# Patient Record
Sex: FEMALE
[De-identification: names, ages, dates, MRNs, and addresses within clinical notes are randomized per-mention and may not be internally consistent; named-entity substitution may affect disease eponyms.]

---

## 2018-11-21 ENCOUNTER — HOSPITAL ENCOUNTER (EMERGENCY)
Dept: HOSPITAL 97 - ER | Age: 73
Discharge: HOME | End: 2018-11-21
Payer: COMMERCIAL

## 2018-11-21 DIAGNOSIS — M54.12: Primary | ICD-10-CM

## 2018-11-21 DIAGNOSIS — Z85.3: ICD-10-CM

## 2018-11-21 PROCEDURE — 70450 CT HEAD/BRAIN W/O DYE: CPT

## 2018-11-21 PROCEDURE — 99284 EMERGENCY DEPT VISIT MOD MDM: CPT

## 2018-11-21 PROCEDURE — 72125 CT NECK SPINE W/O DYE: CPT

## 2018-11-21 NOTE — ER
Nurse's Notes                                                                                     

 [facility]                                                                

Name: Ania Siddiqui                                                                             

Age: 73 yrs                                                                                       

Sex: Female                                                                                       

: 1945                                                                                   

MRN: [id number]                                                                                   

Arrival Date: 2018                                                                          

Time: 14:05                                                                                       

Account#: [id number]                                                                            

Bed 12                                                                                            

Private MD: None, None                                                                            

Diagnosis: Radiculopathy, cervical region                                                         

                                                                                                  

Presentation:                                                                                     

                                                                                             

14:08 Presenting complaint: Patient states: "The back of my neck has been hurting for 3 days. aj1 

      I haven't been able to sleep" Patient denies injury to her neck. Reports that pain          

      radiates into both of her shoulders. Transition of care: patient was not received from      

      another setting of care. Onset of symptoms was 2018. Risk Assessment: Do       

      you want to hurt yourself or someone else? Patient reports no desire to harm self or        

      others. Initial Sepsis Screen: Does the patient meet any 2 criteria? No. Patient's          

      initial sepsis screen is negative. Does the patient have a suspected source of              

      infection? No. Patient's initial sepsis screen is negative. Care prior to arrival: None.    

14:08 Method Of Arrival: Ambulatory                                                           aj1 

14:08 Acuity: KIMI 4                                                                           aj1 

                                                                                                  

Triage Assessment:                                                                                

14:10 General: Appears in no apparent distress. uncomfortable, Behavior is calm, cooperative, aj1 

      appropriate for age. Pain: Complains of pain in neck Pain radiates to left trapezius,       

      right trapezius and neck Pain currently is 8 out of 10 on a pain scale. Quality of pain     

      is described as sharp, Pain began 2-3 days ago. Is continuous, Aggravated by                

      repositioning. Neuro: Level of Consciousness is awake, alert, obeys commands.               

      Cardiovascular: Patient's skin is warm and dry. Respiratory: Airway is patent               

      Respiratory effort is even, unlabored, Respiratory pattern is regular, symmetrical.         

                                                                                                  

Historical:                                                                                       

- Allergies:                                                                                      

14:10 No Known Allergies;                                                                     aj1 

- Home Meds:                                                                                      

14:10 tamoxifen oral oral [Active]; Folic Acid Oral [Active]; Methotrexate Sodium Oral        aj1 

      [Active];                                                                                   

- PMHx:                                                                                           

14:10 breast cancer- in remission; Rheumatoid Arthritis;                                      aj1 

                                                                                                  

- Immunization history:: Flu vaccine is up to date.                                               

- Social history:: Smoking status: Patient/guardian denies using tobacco.                         

- Ebola Screening: : Patient denies travel to an Ebola-affected area in the 21 days               

  before illness onset.                                                                           

                                                                                                  

                                                                                                  

Screening:                                                                                        

15:20 Abuse screen: Denies threats or abuse. Denies injuries from another. Nutritional        ss  

      screening: No deficits noted. Tuberculosis screening: No symptoms or risk factors           

      identified. Never had TB. Fall Risk None identified.                                        

                                                                                                  

Assessment:                                                                                       

15:20 General: Appears in no apparent distress. comfortable, Behavior is calm, cooperative,   ss  

      Denies fever, feeling ill, fatigue, chills. Pain: Complains of pain in right trapezius      

      and left trapezius and neck Pain currently is 8 out of 10 on a pain scale. Quality of       

      pain is described as aching, tender. Neuro: Level of Consciousness is awake, alert,         

      obeys commands, Oriented to person, place, time, situation. Cardiovascular: Capillary       

      refill < 3 seconds is brisk in bilateral fingers. Respiratory: Airway is patent             

      Respiratory effort is even, unlabored, Respiratory pattern is regular, symmetrical.         

      EENT: Oral mucosa is moist. Throat is clear. Derm: Skin is intact, is healthy with good     

      turgor, Skin is pink, warm \T\ dry. normal. Musculoskeletal: Circulation, motion, and       

      sensation intact. Range of motion: intact in all extremities, Swelling absent.              

                                                                                                  

Vital Signs:                                                                                      

14:10  / 83; Pulse 75; Resp 18; Temp 97.9; Pulse Ox 98% on R/A; Weight 78.47 kg (R);    aj1 

      Height 5 ft. 0 in. (152.40 cm) (R); Pain 8/10;                                              

14:10 Body Mass Index 33.79 (78.47 kg, 152.40 cm)                                             aj1 

                                                                                                  

ED Course:                                                                                        

14:05 Patient arrived in ED.                                                                  mr  

14:07 None, None is Private Physician.                                                        mr  

14:09 Triage completed.                                                                       aj1 

14:10 Arm band placed on Patient placed in an exam room.                                      aj1 

15:02 Katelynn Darling FNP-C is PHCP.                                                        kb  

15:02 Bobby Durbin MD is Attending Physician.                                             kb  

15:20 Rita Luna RN is Primary Nurse.                                                    ss  

15:20 Patient has correct armband on for positive identification. Bed in low position. Call   ss  

      light in reach.                                                                             

15:22 CT Head C Spine In Process Unspecified.                                                 EDMS

15:23 CT completed. Patient tolerated procedure well. Patient moved to CT via wheelchair.     vr  

      Patient moved back from CT.                                                                 

16:02 No provider procedures requiring assistance completed. Patient did not have IV access   ss  

      during this emergency room visit.                                                           

                                                                                                  

Administered Medications:                                                                         

No medications were administered                                                                  

                                                                                                  

                                                                                                  

Outcome:                                                                                          

15:56 Discharge ordered by MD.                                                                gunjan  

16:02 Discharged to home ambulatory, with family.                                             ss  

16:02 Condition: good                                                                             

16:02 Discharge instructions given to patient, family, Instructed on discharge instructions,      

      follow up and referral plans. medication usage, Demonstrated understanding of               

      instructions, follow-up care, medications, Prescriptions given X 2.                         

16:03 Patient left the ED.                                                                    ss  

                                                                                                  

Signatures:                                                                                       

Dispatcher [employer]                           EDMS                                                 

Katelynn Darling, ANAYA SETHP-Erendira Faria, RN                     RN   aj1                                                  

Kristy Robbins                                 mr                                                   

Rita Luna, RN                      RN   Shireen Ruiz                                                   

                                                                                                  

**************************************************************************************************

## 2018-11-21 NOTE — EDPHYS
Physician Documentation                                                                           

 [facility]                                                                

Name: Ania Siddiqui                                                                             

Age: 73 yrs                                                                                       

Sex: Female                                                                                       

: 1945                                                                                   

MRN: [id number]                                                                                   

Arrival Date: 2018                                                                          

Time: 14:05                                                                                       

Account#: [id number]                                                                            

Bed 12                                                                                            

Private MD: None, None                                                                            

ED Physician Bobby Durbin                                                                      

HPI:                                                                                              

                                                                                             

15:22 This 73 yrs old  Female presents to ER via Ambulatory with complaints of Neck   kb  

      Problem, Shoulder Pain, Back Pain.                                                          

15:22 The patient or guardian complains of pain, that is acute, tenderness. The symptoms are  kb  

      located diffusely. Onset: The symptoms/episode began/occurred 4 day(s) ago. Context:        

      The problem was sustained at home, The neck injury/problem resulted from from unknown       

      cause. Associated signs and symptoms: Pertinent positives: This patient does not have       

      any pertinent positive signs or symptoms associated with neck pain. The patient denies      

      any alcohol use. The patient is not apparently intoxicated. No neurological symptoms        

      were experienced by the patient prior to arrival in the emergency department. The pain      

      radiates to the left trapezius and right trapezius. Modifying factors: The symptoms are     

      alleviated by nothing. the symptoms are aggravated by pressure. Severity of symptoms:       

      At their worst the symptoms were moderate, in the emergency department the symptoms are     

      unchanged. The patient has not experienced similar symptoms in the past. The patient        

      has not recently seen a physician.                                                          

                                                                                                  

Historical:                                                                                       

- Allergies:                                                                                      

14:10 No Known Allergies;                                                                     aj1 

- Home Meds:                                                                                      

14:10 tamoxifen oral oral [Active]; Folic Acid Oral [Active]; Methotrexate Sodium Oral        aj1 

      [Active];                                                                                   

- PMHx:                                                                                           

14:10 breast cancer- in remission; Rheumatoid Arthritis;                                      aj1 

                                                                                                  

- Immunization history:: Flu vaccine is up to date.                                               

- Social history:: Smoking status: Patient/guardian denies using tobacco.                         

- Ebola Screening: : Patient denies travel to an Ebola-affected area in the 21 days               

  before illness onset.                                                                           

                                                                                                  

                                                                                                  

ROS:                                                                                              

15:21 Constitutional: Negative for fever, chills, and weight loss, Cardiovascular: Negative   kb  

      for chest pain, palpitations, and edema, Respiratory: Negative for shortness of breath,     

      cough, wheezing, and pleuritic chest pain, Abdomen/GI: Negative for abdominal pain,         

      nausea, vomiting, diarrhea, and constipation, MS/Extremity: Negative for injury and         

      deformity, Skin: Negative for injury, rash, and discoloration, Neuro: Negative for          

      headache, weakness, numbness, tingling, and seizure.                                        

15:21 Neck: Positive for pain with movement, pain at rest.                                        

15:21 Back: Positive for pain at rest, pain with movement, of the left trapezius and right        

      trapezius.                                                                                  

                                                                                                  

Exam:                                                                                             

15:21 Constitutional:  This is a well developed, well nourished patient who is awake, alert,  kb  

      and in no acute distress. Head/Face:  Normocephalic, atraumatic. Neck:  Trachea             

      midline, no thyromegaly or masses palpated, and no cervical lymphadenopathy.  Supple,       

      full range of motion without nuchal rigidity, or vertebral point tenderness.  No            

      Meningismus. Chest/axilla:  Normal chest wall appearance and motion.  Nontender with no     

      deformity.  No lesions are appreciated. Cardiovascular:  Regular rate and rhythm with a     

      normal S1 and S2.  No gallops, murmurs, or rubs.  Normal PMI, no JVD.  No pulse             

      deficits. Respiratory:  Lungs have equal breath sounds bilaterally, clear to                

      auscultation and percussion.  No rales, rhonchi or wheezes noted.  No increased work of     

      breathing, no retractions or nasal flaring. Abdomen/GI:  Soft, non-tender, with normal      

      bowel sounds.  No distension or tympany.  No guarding or rebound.  No evidence of           

      tenderness throughout. Skin:  Warm, dry with normal turgor.  Normal color with no           

      rashes, no lesions, and no evidence of cellulitis. MS/ Extremity:  Pulses equal, no         

      cyanosis.  Neurovascular intact.  Full, normal range of motion. Neuro:  Awake and           

      alert, GCS 15, oriented to person, place, time, and situation.  Cranial nerves II-XII       

      grossly intact.  Motor strength 5/5 in all extremities.  Sensory grossly intact.            

      Cerebellar exam normal.  Normal gait.                                                       

15:21 Back: pain, that is mild,       of the  left trapezius and right trapezius, ROM is          

      normal, normal spinal alignment noted.                                                      

                                                                                                  

Vital Signs:                                                                                      

14:10  / 83; Pulse 75; Resp 18; Temp 97.9; Pulse Ox 98% on R/A; Weight 78.47 kg (R);    aj1 

      Height 5 ft. 0 in. (152.40 cm) (R); Pain 8/10;                                              

14:10 Body Mass Index 33.79 (78.47 kg, 152.40 cm)                                             aj1 

                                                                                                  

MDM:                                                                                              

15:02 Patient medically screened.                                                             kb  

15:21 Data reviewed: vital signs, nurses notes. Data interpreted: Pulse oximetry: on room air kb  

      is 98 %. Interpretation: normal.                                                            

15:55 Counseling: I had a detailed discussion with the patient and/or guardian regarding: the kb  

      historical points, exam findings, and any diagnostic results supporting the                 

      discharge/admit diagnosis, radiology results, the need for outpatient follow up, a          

      family practitioner, to return to the emergency department if symptoms worsen or            

      persist or if there are any questions or concerns that arise at home.                       

                                                                                                  

                                                                                             

15:11 Order name: CT Head C Spine; Complete Time: 15:41                                       kb  

                                                                                                  

Administered Medications:                                                                         

No medications were administered                                                                  

                                                                                                  

                                                                                                  

Disposition:                                                                                      

18 15:56 Discharged to Home. Impression: Radiculopathy, cervical region.                    

- Condition is Stable.                                                                            

- Discharge Instructions: Cervical Radiculopathy, Easy-to-Read.                                   

- Prescriptions for Cyclobenzaprine 10 mg Oral Tablet - take 1 tablet by ORAL route               

  every 8 hours As needed; 21 tablet. Diclofenac Sodium 75 mg Oral Tablet, Delayed                

  Release (E.C.) - take 1 tablet by ORAL route 2 times per day As needed; 30 tablet.              

- Medication Reconciliation Form, Thank You Letter, Antibiotic Education, Prescription            

  Opioid Use form.                                                                                

- Follow up: Emergency Department; When: As needed; Reason: Worsening of condition.               

  Follow up: Private Physician; When: 2 - 3 days; Reason: Recheck today's complaints,             

  Continuance of care, Re-evaluation by your physician.                                           

                                                                                                  

                                                                                                  

                                                                                                  

Addendum:                                                                                         

2018                                                                                        

     06:22 Co-signature as Attending Physician, Bobby Durbin MD I agree with the assessment and  c
ha

           plan of care.                                                                          

                                                                                                  

Signatures:                                                                                       

Dispatcher [employer]                           Katelynn Bailey, FNP-C                 FNP-Ckb                                                   

Erendira Arrieta, RN                     RN   aj1                                                  

Bobby Durbin MD MD cha Smirch, Shelby, RN                      RN   ss                                                   

                                                                                                  

Corrections: (The following items were deleted from the chart)                                    

                                                                                             

16:03 15:56 2018 15:56 Discharged to Home. Impression: Radiculopathy, cervical region.  ss  

      Condition is Stable. Forms are Medication Reconciliation Form, Thank You Letter,            

      Antibiotic Education, Prescription Opioid Use. Follow up: Emergency Department; When:       

      As needed; Reason: Worsening of condition. Follow up: Private Physician; When: 2 - 3        

      days; Reason: Recheck today's complaints, Continuance of care, Re-evaluation by your        

      physician. kb                                                                               

                                                                                                  

**************************************************************************************************

## 2018-11-21 NOTE — RAD REPORT
EXAM DESCRIPTION:  CT - [id number] - 11/21/2018 3:22 pm

 

CLINICAL HISTORY:  Trauma, head and neck injury.

PAIN

 

COMPARISON:  No comparisons

 

TECHNIQUE:  Axial 5 mm thick images of the head were obtained.

 

Axial 2 mm thick images of the cervical spine were obtained with sagittal and coronal reconstruction 
images generated and reviewed.

 

All CT scans are performed using dose optimization technique as appropriate and may include automated
 exposure control or mA/KV adjustment according to patient size.

 

FINDINGS:  CT HEAD WITHOUT CONTRAST:

 

No acute hemorrhage, hydrocephalus or extra-axial collection is identified.No areas of brain edema or
 midline shift.

 

The paranasal sinuses and mastoids are clear.The calvarium is intact.

 

CT CERVICAL SPINE WITHOUT CONTRAST:

 

No fracture or subluxation.Multilevel lower cervical degenerative changes present.No prevertebral sof
t tissues swelling is identified.

 

IMPRESSION:  No acute intracranial or cervical spine findings.

 

Mild lower cervical spondylosis.

## 2021-09-17 ENCOUNTER — HOSPITAL ENCOUNTER (EMERGENCY)
Dept: HOSPITAL 97 - ER | Age: 76
Discharge: HOME | End: 2021-09-17
Payer: COMMERCIAL

## 2021-09-17 VITALS — OXYGEN SATURATION: 96 % | TEMPERATURE: 98.8 F | SYSTOLIC BLOOD PRESSURE: 153 MMHG | DIASTOLIC BLOOD PRESSURE: 90 MMHG
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
## 2021-09-17 DIAGNOSIS — M25.532: Primary | ICD-10-CM
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
## 2021-09-17 PROCEDURE — 99283 EMERGENCY DEPT VISIT LOW MDM: CPT

## 2021-09-17 NOTE — RAD REPORT
EXAM DESCRIPTION:  RAD - Forearm Left - 9/17/2021 4:59 pm

 

CLINICAL HISTORY:   Left forearm pain status post injury

 

FINDINGS:   No fracture is seen

 

Calcifications within the dorsal soft tissues of the upper forearm.

## 2021-09-17 NOTE — ER
Nurse's Notes                                                                                     

 [facility]                                                                 

Name: Ania Siddiqui                                                                             

Age: 76 yrs                                                                                       

Sex: Female                                                                                       

: 1945                                                                                   

MRN: [id number]                                                                                   

Arrival Date: 2021                                                                          

Time: 15:39                                                                                       

Account#: [id number]                                                                            

Bed Waiting                                                                                       

Private MD:                                                                                       

Diagnosis: Pain in left wrist;Fall on same level from slipping, tripping and stumbling without    

  subsequent striking against object                                                              

                                                                                                  

Presentation:                                                                                     

                                                                                             

16:01 Chief complaint: Patient states: "I was pushing a trash can and I fell and hurt my left aa5 

      wrist". Coronavirus screen: At this time, the client does not indicate any symptoms         

      associated with coronavirus-19. Ebola Screen: Patient negative for fever greater than       

      or equal to 101.5 degrees Fahrenheit, and additional compatible Ebola Virus Disease         

      symptoms. Initial Sepsis Screen: Does the patient meet any 2 criteria? No. Patient's        

      initial sepsis screen is negative. Does the patient have a suspected source of              

      infection? No. Patient's initial sepsis screen is negative. Risk Assessment: Do you         

      want to hurt yourself or someone else? Patient reports no desire to harm self or            

      others. Onset of symptoms was 2021.                                           

16:01 Method Of Arrival: Ambulatory                                                           aa5 

16:01 Acuity: KIMI 4                                                                           aa5 

                                                                                                  

Historical:                                                                                       

- Allergies:                                                                                      

16:03 No Known Allergies;                                                                     aa5 

- PMHx:                                                                                           

16:01 breast cancer- in remission; Rheumatoid Arthritis;                                      aa5 

                                                                                                  

- Immunization history:: Client reports receiving the 2nd dose of the Covid vaccine.              

- Social history:: Smoking status: Patient denies any tobacco usage or history of.                

                                                                                                  

                                                                                                  

Assessment:                                                                                       

17:37 Reassessment: Patient is alert, oriented x 3, equal unlabored respirations, skin        aa5 

      warm/dry/pink.                                                                              

                                                                                                  

Vital Signs:                                                                                      

16:01  / 90; Pulse 70; Resp 18 S; Temp 98.8(TE); Pulse Ox 96% on R/A; Weight 78.93 kg   aa5 

      (R); Height 4 ft. 11 in. (149.86 cm) (R);                                                   

16:01 Body Mass Index 35.14 (78.93 kg, 149.86 cm)                                             aa5 

                                                                                                  

ED Course:                                                                                        

15:39 Patient arrived in ED.                                                                  as  

16:00 [facility]                                                        kb  

16:00 Mahin Ervin MD is Attending Physician.                                              kb  

16:01 Arm band placed on.                                                                     aa5 

16:02 Triage completed.                                                                       aa5 

16:59 Forearm Left XRAY In Process Unspecified.                                               EDMS

16:59 Hand Left 3 View XRAY In Process Unspecified.                                           EDMS

17:38 No provider procedures requiring assistance completed. Patient did not have IV access   aa5 

      during this emergency room visit.                                                           

                                                                                                  

Administered Medications:                                                                         

No medications were administered                                                                  

                                                                                                  

                                                                                                  

Outcome:                                                                                          

17:30 Discharge ordered by MD.                                                                kb  

17:37 Discharged to home ambulatory, with family.                                             aa5 

17:37 Condition: stable                                                                           

17:37 Discharge instructions given to patient, Instructed on discharge instructions, follow       

      up and referral plans. Demonstrated understanding of instructions, follow-up care.          

17:38 Patient left the ED.                                                                    aa5 

                                                                                                  

Signatures:                                                                                       

Dispatcher [employer]                           EDKatelynn Huber, FNP-C                 FNP-Margo Blanco Audri, RN                     RN   aa5                                                  

                                                                                                  

Corrections: (The following items were deleted from the chart)                                    

16:04 16:01 Temp 98.8F Temporal; aa5                                                          aa5 

                                                                                                  

**************************************************************************************************

## 2021-09-17 NOTE — RAD REPORT
EXAM DESCRIPTION:  RAD -Hand Left 3 View - 9/17/2021 4:59 pm

 

CLINICAL HISTORY:

Left hand pain status post injury

 

FINDINGS:  No fracture or dislocation is seen.

 

The bones are osteoporotic.

 

Erosive arthropathy involves the PIP joints.

 

Calcification involves soft tissues of the wrist.

 

If patient continues to have symptoms to suggest an occult fracture follow-up x-ray in 7 days would b
e recommended

## 2021-09-17 NOTE — EDPHYS
Physician Documentation                                                                           

 [facility]                                                                 

Name: Ania Siddiqui                                                                             

Age: 76 yrs                                                                                       

Sex: Female                                                                                       

: 1945                                                                                   

MRN: [id number]                                                                                   

Arrival Date: 2021                                                                          

Time: 15:39                                                                                       

Account#: [id number]                                                                            

Bed Waiting                                                                                       

Private MD:                                                                                       

ED Physician Mahin Ervin                                                                       

HPI:                                                                                              

                                                                                             

23:10 This 76 yrs old  Female presents to ER via Ambulatory with complaints of Wrist  kb  

      Injury.                                                                                     

23:10 The patient or guardian reports pain. The complaints affect the left wrist diffusely.   kb  

      Context: The problem was sustained at home, resulted from a fall. Onset: The                

      symptoms/episode began/occurred yesterday. Modifying factors: The symptoms are              

      alleviated by nothing, the symptoms are aggravated by nothing. Associated signs and         

      symptoms: The patient has no apparent associated signs or symptoms. The patient has not     

      experienced similar symptoms in the past. The patient has not recently seen a               

      physician. Patient reports she was pushing a trash can full of debris to the road and       

      slipped and fell yesterday. States she did not have any pain yesterday, but this            

      morning woke up with pain to left wrist..                                                   

                                                                                                  

Historical:                                                                                       

- Allergies:                                                                                      

16:03 No Known Allergies;                                                                     aa5 

- PMHx:                                                                                           

16:01 breast cancer- in remission; Rheumatoid Arthritis;                                      aa5 

                                                                                                  

- Immunization history:: Client reports receiving the 2nd dose of the Covid vaccine.              

- Social history:: Smoking status: Patient denies any tobacco usage or history of.                

                                                                                                  

                                                                                                  

ROS:                                                                                              

23:10 Constitutional: Negative for fever, chills, and weight loss.                            kb  

23:10 MS/extremity: Positive for injury or acute deformity, pain, of the left wrist.              

23:10 All other systems are negative.                                                             

                                                                                                  

Exam:                                                                                             

23:12 Constitutional:  This is a well developed, well nourished patient who is awake, alert,  kb  

      and in no acute distress. Head/Face:  Normocephalic, atraumatic. ENT:  Moist Mucous         

      membranes Respiratory:  Respirations even and unlabored. No increased work of               

      breathing, no retractions or nasal flaring. Skin:  Warm, dry with normal turgor.            

      Normal color. Neuro:  Awake and alert, GCS 15, oriented to person, place, time, and         

      situation. Moves all extremities. Normal gait. Psych:  Awake, alert, with orientation       

      to person, place and time.  Behavior, mood, and affect are within normal limits.            

23:12 Musculoskeletal/extremity: Extremities: grossly normal except: noted in the left wrist:     

      pain, ROM: intact in all extremities, Circulation is intact in all extremities.             

      Sensation intact.                                                                           

                                                                                                  

Vital Signs:                                                                                      

16:01  / 90; Pulse 70; Resp 18 S; Temp 98.8(TE); Pulse Ox 96% on R/A; Weight 78.93 kg   aa5 

      (R); Height 4 ft. 11 in. (149.86 cm) (R);                                                   

16:01 Body Mass Index 35.14 (78.93 kg, 149.86 cm)                                             aa5 

                                                                                                  

MDM:                                                                                              

16:03 Patient medically screened.                                                             kb  

23:04 Data reviewed: vital signs, nurses notes. Data interpreted: Pulse oximetry: on room air kb  

      is 96 %. Interpretation: normal. Counseling: I had a detailed discussion with the           

      patient and/or guardian regarding: the historical points, exam findings, and any            

      diagnostic results supporting the discharge/admit diagnosis, radiology results, the         

      need for outpatient follow up, a orthopedic surgeon, to return to the emergency             

      department if symptoms worsen or persist or if there are any questions or concerns that     

      arise at home.                                                                              

                                                                                                  

                                                                                             

16:03 Order name: Forearm Left XRAY; Complete Time: 17:06                                     kb  

                                                                                             

16:03 Order name: Hand Left 3 View XRAY; Complete Time: 17:08                                 kb  

                                                                                                  

Administered Medications:                                                                         

No medications were administered                                                                  

                                                                                                  

                                                                                                  

Disposition:                                                                                      

                                                                                             

08:07 Co-signature as Attending Physician, Mahin Ervin MD I agree with the assessment and   kdr 

      plan of care.                                                                               

                                                                                                  

Disposition Summary:                                                                              

21 17:30                                                                                    

Discharge Ordered                                                                                 

      Location: Home                                                                          kb  

      Condition: Stable                                                                       kb  

      Diagnosis                                                                                   

        - Pain in left wrist                                                                  kb  

        - Fall on same level from slipping, tripping and stumbling without subsequent         kb  

      striking against object                                                                     

      Followup:                                                                               kb  

        - With: Emergency Department                                                               

        - When: As needed                                                                          

        - Reason: Worsening of condition                                                           

      Followup:                                                                               kb  

        - With: Private Physician                                                                  

        - When: 2 - 3 days                                                                         

        - Reason: Recheck today's complaints, Continuance of care, Re-evaluation by your           

      physician                                                                                   

      Discharge Instructions:                                                                     

        - Discharge Summary Sheet                                                             kb  

        - Wrist Pain, Adult, Easy-to-Read                                                     kb  

      Forms:                                                                                      

        - Medication Reconciliation Form                                                      kb  

        - Thank You Letter                                                                    kb  

        - Antibiotic Education                                                                kb  

        - Prescription Opioid Use                                                             kb  

Signatures:                                                                                       

Dispatcher [employer]                           EDKatelynn Huber FNP-C FNP-Meera Ervin, Mahin, MD                      MD   kdr                                                  

Heather Huerta, RN                     RN   aa5                                                  

                                                                                                  

**************************************************************************************************